# Patient Record
Sex: FEMALE | Race: WHITE | NOT HISPANIC OR LATINO | Employment: STUDENT | URBAN - METROPOLITAN AREA
[De-identification: names, ages, dates, MRNs, and addresses within clinical notes are randomized per-mention and may not be internally consistent; named-entity substitution may affect disease eponyms.]

---

## 2021-02-08 ENCOUNTER — NURSE TRIAGE (OUTPATIENT)
Dept: NURSING | Facility: CLINIC | Age: 21
End: 2021-02-08

## 2021-02-08 ENCOUNTER — E-VISIT (OUTPATIENT)
Dept: URGENT CARE | Facility: URGENT CARE | Age: 21
End: 2021-02-08

## 2021-02-08 ENCOUNTER — OFFICE VISIT (OUTPATIENT)
Dept: URGENT CARE | Facility: URGENT CARE | Age: 21
End: 2021-02-08
Payer: OTHER GOVERNMENT

## 2021-02-08 VITALS
DIASTOLIC BLOOD PRESSURE: 86 MMHG | HEIGHT: 62 IN | WEIGHT: 165 LBS | TEMPERATURE: 99.2 F | OXYGEN SATURATION: 100 % | HEART RATE: 99 BPM | BODY MASS INDEX: 30.36 KG/M2 | SYSTOLIC BLOOD PRESSURE: 122 MMHG | RESPIRATION RATE: 12 BRPM

## 2021-02-08 DIAGNOSIS — Z20.822 SUSPECTED COVID-19 VIRUS INFECTION: Primary | ICD-10-CM

## 2021-02-08 DIAGNOSIS — R09.81 CONGESTION OF PARANASAL SINUS: Primary | ICD-10-CM

## 2021-02-08 PROCEDURE — 99203 OFFICE O/P NEW LOW 30 MIN: CPT | Performed by: STUDENT IN AN ORGANIZED HEALTH CARE EDUCATION/TRAINING PROGRAM

## 2021-02-08 PROCEDURE — 99207 PR NO CHARGE LOS: CPT | Performed by: PHYSICIAN ASSISTANT

## 2021-02-08 RX ORDER — FLUTICASONE PROPIONATE 50 MCG
1 SPRAY, SUSPENSION (ML) NASAL DAILY
Qty: 16 G | Refills: 1 | Status: SHIPPED | OUTPATIENT
Start: 2021-02-08 | End: 2021-03-30

## 2021-02-08 RX ORDER — ECHINACEA PURPUREA EXTRACT 125 MG
TABLET ORAL
Qty: 104 ML | Refills: 0 | Status: SHIPPED | OUTPATIENT
Start: 2021-02-08 | End: 2021-03-30

## 2021-02-08 RX ORDER — ELASTIC BANDAGE 1"X2.2YD
1 BANDAGE TOPICAL 2 TIMES DAILY
Qty: 60 PACKET | Refills: 0 | Status: SHIPPED | OUTPATIENT
Start: 2021-02-08 | End: 2021-03-10

## 2021-02-08 ASSESSMENT — MIFFLIN-ST. JEOR: SCORE: 1461.69

## 2021-02-08 NOTE — PROGRESS NOTES
"  Assessment & Plan     #Sinus Congestion  #Eustachian tube Dysfunction   Start nasal clearance regime as outlined before. Low suspicion for superimposed bacterial sinus infection however will order Augmentin to be picked up in no improvement with regimes after 4 days or new fever develops.   Nasal clearance:    1.neti pot nasal clearage (twice a day)    2.Saline nasal spray (up to 4 times a day)   3.Flonase steroid spray (daily - mornings)   Augmentin BID X 7 days to be started if no improvement or worsening symptoms.     25 minutes spent on the date of the encounter doing chart review, patient visit and documentation     Follow up if no improvement in symptoms.     Chata Herron MD  [facility]    Danita Gorman is a 20 year old who presents to clinic today for the following health issues : right ear and sinus pressure/apin after covid infection     HPI     Vita is a pleasant 19 yo F w/ recent covid infection (diagnosed 10 days ago) c/b asnomia and fatigue, here with 4 days of worsening right ear pain and sinus pressure and worsening nasal congestion. She reports increased nasal drainage, slightly yellow mostly clear w/o purulence or blood. Pain in ear on right, feels full and hurts when she blows her nose. Denies new fevers. Endorses frontal headache over her eyes. Laying down seems to make the headache and right ear pain worse.        Review of Systems    ROS: 10 point ROS neg other than the symptoms noted above in the HPI.        Objective    /86   Pulse 99   Temp 99.2  F (37.3  C) (Oral)   Resp 12   Ht 1.575 m (5' 2\")   Wt 74.8 kg (165 lb)   LMP 01/25/2021   SpO2 100%   Breastfeeding No   BMI 30.18 kg/m    Body mass index is 30.18 kg/m .  Physical Exam   General Appearance: Non toxic, NAD  Eyes: no scleral injections, EOMI, PERRLA  HEENT: NCAT, nares patent, MMM, no oropharynx erythema, no oral lesions or petechiae, TM clear b/l  Neck: No JVD, ROM " intact, no nuchal rigidity, no ttp over SCMs b/l    Respiratory: CTAB, no work of breathing, no crackles, wheezes, ronchi  Cardiovascular: RRR,normal S1, S2, no murmur, extremities wwp, cap refill < 2 secs   GI: soft, no distention, no ttp, no organomegaly, normoactive bs, no peritoneal signs    Lymph/Hematologic: no cervical LAD, no generalized LAD, no bruises   Skin: no rashes, lesions   Musculoskeletal: no peripheral edema, no jnt ttp or effusions    Neurologic: A&OX3, CN 2-10 intact, no focal decifict, strength, sensation intact   Psychiatric: appropriate mood

## 2021-02-08 NOTE — PATIENT INSTRUCTIONS
Dear Vita Myranda Lucio,    Your symptoms show that you may have coronavirus (COVID-19). This illness can cause fever, cough and trouble breathing. Many people get a mild case and get better on their own. Some people can get very sick.    Will I be tested for COVID-19?  We would like to test you for Covid-19 virus. I have placed orders for this test.     To schedule: go to your [employer] home page and scroll down to the section that says  You have an appointment that needs to be scheduled  and click the large green button that says  Schedule Now  and follow the steps to find the next available openings.    If you are unable to complete these [employer] scheduling steps, please call [phone] to schedule your testing.     Return to work/school/ guidance:  Please let your workplace manager and staffing office know when your quarantine ends     We can t give you an exact date as it depends on the above. You can calculate this on your own or work with your manager/staffing office to calculate this. (For example if you were exposed on 10/4, you would have to quarantine for 14 full days. That would be through 10/18. You could return on 10/19.)      If you receive a positive COVID-19 test result, follow the guidance of the those who are giving you the results. Usually the return to work is 10 (or in some cases 20 days from symptom onset.) If you work at [facility] [employer], you must also be cleared by Employee Occupational Health and Safety to return to work.        If you receive a negative COVID-19 test result and did not have a high risk exposure to someone with a known positive COVID-19 test, you can return to work once you're free of fever for 24 hours without fever-reducing medication and your symptoms are improving or resolved.      If you receive a negative COVID-19 test and If you had a high risk exposure to someone who has tested positive for COVID-19 then you can return to work 14 days after your last  contact with the positive individual    Note: If you have ongoing exposure to the covid positive person, this quarantine period may be more than 14 days. (For example, if you are continued to be exposed to your child who tested positive and cannot isolate from them, then the quarantine of 7-14 days can't start until your child is no longer contagious. This is typically 10 days from onset of the child's symptoms. So the total duration may be 17-24 days in this case.)    Sign up for [employer].   We know it's scary to hear that you might have COVID-19. We want to track your symptoms to make sure you're okay over the next 2 weeks. Please look for an email from [employer]--this is a free, online program that we'll use to keep in touch. To sign up, follow the link in the email you will receive. Learn more at [email or web address]    How can I take care of myself?    Get lots of rest. Drink extra fluids (unless a doctor has told you not to)    Take Tylenol (acetaminophen) or ibuprofen for fever or pain. If you have liver or kidney problems, ask your family doctor if it's okay to take Tylenol o ibuprofen    If you have other health problems (like cancer, heart failure, an organ transplant or severe kidney disease): Call your specialty clinic if you don't feel better in the next 2 days.    Know when to call 911. Emergency warning signs include:  o Trouble breathing or shortness of breath  o Pain or pressure in the chest that doesn't go away  o Feeling confused like you haven't felt before, or not being able to wake up  o Bluish-colored lips or face    Where can I get more information?  [facility] - About COVID-19:   [email or web address]    CDC - What to Do If You're Sick:   www.cdc.gov/coronavirus/2019-ncov/about/steps-when-sick.html

## 2021-02-08 NOTE — TELEPHONE ENCOUNTER
"\"I tested positive for covid, today is day 10  for me. Now I also have ear pain, headache and sore throat. No fever or other sx.  Triaged, gave home care advice and advised MERT Overton RN [facility]    COVID 19 Nurse Triage Plan/Patient Instructions    Please be aware that novel coronavirus (COVID-19) may be circulating in the community. If you develop symptoms such as fever, cough, or SOB or if you have concerns about the presence of another infection including coronavirus (COVID-19), please contact your health care provider or visit [email or web address].     Disposition/Instructions    In-Person Visit with provider recommended. Reference Visit Selection Guide.    Thank you for taking steps to prevent the spread of this virus.  o Limit your contact with others.  o Wear a simple mask to cover your cough.  o Wash your hands well and often.    Resources    [facility]: About COVID-19: [email or web address]    CDC: What to Do If You're Sick: www.cdc.gov/coronavirus/2019-ncov/about/steps-when-sick.html    CDC: Ending Home Isolation: www.cdc.gov/coronavirus/2019-ncov/hcp/disposition-in-home-patients.html     CDC: Caring for Someone: www.cdc.gov/coronavirus/2019-ncov/if-you-are-sick/care-for-someone.html     [facility]: Interim Guidance for Hospital Discharge to Home: www.health.[facility].mn.us/diseases/coronavirus/hcp/hospdischarge.pdf    [facility] clinical trials (COVID-19 research studies): clinicalaffairs.[facility]/n-clinical-trials     Below are the COVID-19 hotlines at the [facility] of Health ([facility]). Interpreters are available.   o For health questions: Call 439-483-4589 or 1-366.567.8335 (7 a.m. to 7 p.m.)  o For questions about schools and childcare: Call 328-319-6303 or 1-325.651.3667 (7 a.m. to 7 p.m.)                      Additional Information    Negative: Fever > 104 F (40 C)    Negative: Patient sounds very sick or weak to the triager    Negative: [1] SEVERE " pain AND [2] not improved 2 hours after taking analgesic medication (e.g., ibuprofen or acetaminophen)    Negative: Walking is very unsteady    Negative: Sudden onset of ear pain after long - thin object was inserted into the ear canal (e.g., pencil, Q-tip)    Negative: Diabetes mellitus or weak immune system (e.g., HIV positive, cancer chemo, splenectomy, organ transplant, chronic steroids)    Negative: New blurred vision or vision changes    Negative: White, yellow, or green discharge    Negative: Bloody discharge or unexplained bleeding from ear canal    Earache  (Exceptions: brief ear pain of < 60 minutes duration, earache occurring during air travel    Protocols used: EARACHE-A-

## 2021-02-08 NOTE — PATIENT INSTRUCTIONS
neti pot nasal clearage (twice a day)   Saline nasal spray (up to 4 times a day)  flonase steroid spray (daily - mornings)     If no symptoms improve in 5 days OR new fever, worsening headaches or pain or purulent drainage, okay to start antibiotics. Augmentin is the antibiotic

## 2021-03-07 ENCOUNTER — HEALTH MAINTENANCE LETTER (OUTPATIENT)
Age: 21
End: 2021-03-07

## 2021-03-28 ENCOUNTER — NURSE TRIAGE (OUTPATIENT)
Dept: NURSING | Facility: CLINIC | Age: 21
End: 2021-03-28

## 2021-03-28 NOTE — TELEPHONE ENCOUNTER
"Patient complains of left wrist pain, believes she has a ganglion cyst.  Has had for about one year and has worsened in past couple of days.  Ibuprofen provides very little relief.  Connected to scheduling for office visit within next 3 days.    Reason for Disposition    [1] MODERATE pain (e.g., interferes with normal activities) AND [2] present > 3 days    Additional Information    Negative: [1] Similar pain previously AND [2] it was from \"heart attack\"    Negative: [1] Similar pain previously AND [2] it was from \"angina\" AND [3] not relieved by nitroglycerin    Negative: Sounds like a life-threatening emergency to the triager    Negative: Followed a hand or wrist injury    Negative: Chest pain    Negative: Caused by an animal bite    Negative: Caused by a human bite    Negative: Wound looks infected    Negative: Finger pain is main symptom    Negative: Arm pain is main symptom    Negative: [1] Swollen joint AND [2] fever    Negative: [1] Red area or streak AND [2] fever    Negative: Patient sounds very sick or weak to the triager    Negative: [1] SEVERE pain (e.g., excruciating, unable to use hand at all) AND [2] not improved after 2 hours of pain medicine    Negative: [1] Looks infected (spreading redness, pus) AND [2] large red area (> 2 in. or 5 cm)    Negative: Weakness (i.e., loss of strength) of new onset in hand or fingers  (Exceptions: not truly weak, hand feels weak because of pain; weakness present > 2 weeks)    Negative: Numbness (i.e., loss of sensation) in hand or fingers (Exception: just tingling; numbness present > 2 weeks)    Negative: Looks like a boil, infected sore, deep ulcer or other infected rash (spreading redness, pus)    Negative: [1] Localized rash is very painful AND [2] no fever    Protocols used: HAND AND WRIST PAIN-A-AH      "

## 2021-03-30 ENCOUNTER — OFFICE VISIT (OUTPATIENT)
Dept: FAMILY MEDICINE | Facility: CLINIC | Age: 21
End: 2021-03-30

## 2021-03-30 ENCOUNTER — ANCILLARY PROCEDURE (OUTPATIENT)
Dept: GENERAL RADIOLOGY | Facility: CLINIC | Age: 21
End: 2021-03-30
Attending: FAMILY MEDICINE

## 2021-03-30 VITALS
TEMPERATURE: 97.8 F | DIASTOLIC BLOOD PRESSURE: 86 MMHG | SYSTOLIC BLOOD PRESSURE: 137 MMHG | HEART RATE: 100 BPM | OXYGEN SATURATION: 95 % | RESPIRATION RATE: 18 BRPM | WEIGHT: 219 LBS | BODY MASS INDEX: 40.06 KG/M2

## 2021-03-30 DIAGNOSIS — M25.532 LEFT WRIST PAIN: Primary | ICD-10-CM

## 2021-03-30 PROCEDURE — 73110 X-RAY EXAM OF WRIST: CPT | Mod: LT | Performed by: RADIOLOGY

## 2021-03-30 PROCEDURE — 99213 OFFICE O/P EST LOW 20 MIN: CPT | Performed by: FAMILY MEDICINE

## 2021-03-30 NOTE — PROGRESS NOTES
"    Assessment & Plan     Left wrist pain  No obvious cyst palpable on exam. Slight bony protrusion over area of pain. Discuss follow-up and evaluation by ortho. Continue to use wrist brace to help with symptoms. DDx: Arthritis, calcified tendon, ganglion cyst, over use.  - XR Wrist Left G/E 3 Views  - Orthopedic & Spine  Referral        15 minutes spent on the date of the encounter doing chart review, history and exam, documentation and further activities per the note    DO SUSHIL [facility]    Danita Gorman is a 20 year old who presents for the following health issues:    HPI   Top of left hand/wrist, \"cyst\" there for several years. Pain the last few days. Sharp pain, radiates up to her elbow. Pain worse with movement. Wearing wrist brace which helps. Works as a nanny. Denies numbness, tingling, weakness.     Left wrist pain  Onset/Duration: x 2 days  Description   Patient complains of left wrist pain, believes she has a ganglion cyst.  Has had for about one year and has worsened in past couple of days.  Location: wrist - left  Joint Swelling: no  Redness: no  Pain: YES  Warmth: no  Intensity:  severe  Progression of Symptoms:  worsening  Accompanying signs and symptoms:   Fevers: no  Numbness/tingling/weakness: no  History  Trauma to the area: no  Recent illness:  no  Previous similar problem: YES  Previous evaluation:  no  Precipitating or alleviating factors:  Aggravating factors include: lifting  Therapies tried and outcome:  Ibuprofen doesn't help     Review of Systems   CONSTITUTIONAL: NEGATIVE for fever, chills, change in weight  INTEGUMENTARY/SKIN: NEGATIVE for worrisome rashes, moles or lesions  NEURO: NEGATIVE for weakness, dizziness or paresthesias      Objective    /86 (BP Location: Left arm, Patient Position: Sitting, Cuff Size: Adult Regular)   Pulse 100   Temp 97.8  F (36.6  C) (Tympanic)   Resp 18   Wt 99.3 kg (219 lb)   SpO2 95%   BMI " 40.06 kg/m    Body mass index is 40.06 kg/m .  Physical Exam   GENERAL: healthy, alert and no distress  RESP: lungs clear to auscultation - no rales, rhonchi or wheezes  MS: strength and sensation intact in left hand. Slight bony protrusion over dorsal aspect of mid-wrist, slightly tender to palpation. Pain with ROM of left wrist. No obvious swelling of left wrist. Pulses intact in leftt upper extremity.    SKIN: no suspicious lesions or rashes

## 2021-03-31 NOTE — TELEPHONE ENCOUNTER
DIAGNOSIS: Left wrist pain /Dr Farooq/ XR   APPOINTMENT DATE: 4.7.21   NOTES STATUS DETAILS   OFFICE NOTE from referring provider Internal 3.30.21 Dr Rosas Farooq, [facility]   OFFICE NOTE from other specialist N/A    DISCHARGE SUMMARY from hospital N/A    DISCHARGE REPORT from the ER N/A    OPERATIVE REPORT N/A    EMG report N/A    MEDICATION LIST N/A    MRI N/A    DEXA (osteoporosis/bone health) N/A    CT SCAN N/A    XRAYS (IMAGES & REPORTS) Internal 3.30.21 L wrist

## 2021-04-07 ENCOUNTER — PRE VISIT (OUTPATIENT)
Dept: ORTHOPEDICS | Facility: CLINIC | Age: 21
End: 2021-04-07

## 2021-04-07 ENCOUNTER — OFFICE VISIT (OUTPATIENT)
Dept: ORTHOPEDICS | Facility: CLINIC | Age: 21
End: 2021-04-07

## 2021-04-07 VITALS — BODY MASS INDEX: 35.36 KG/M2 | WEIGHT: 220 LBS | HEIGHT: 66 IN

## 2021-04-07 DIAGNOSIS — G89.29 CHRONIC PAIN OF LEFT WRIST: Primary | ICD-10-CM

## 2021-04-07 DIAGNOSIS — M25.532 CHRONIC PAIN OF LEFT WRIST: Primary | ICD-10-CM

## 2021-04-07 PROCEDURE — 99203 OFFICE O/P NEW LOW 30 MIN: CPT | Performed by: FAMILY MEDICINE

## 2021-04-07 ASSESSMENT — MIFFLIN-ST. JEOR: SCORE: 1774.66

## 2021-04-07 NOTE — PROGRESS NOTES
CHIEF COMPLAINT:  Pain of the Left Wrist       HISTORY OF PRESENT ILLNESS  Ms. Valdes is a pleasant 20 year old year old female who presents to clinic today with left wrist and hand pain.  Vita explains that she thought she had a ganglion cyst, it swells up and then goes out.  She was seen by her primary care office for potential ganglion cyst, she was given a wrist brace that did help for some time, she wore this intermittently.    Onset: gradual  Location: left wrist  Quality:  aching and sharp  Duration: 2 years   Severity: 8/10 at worst  Timing:intermittent episodes  Modifying factors:  resting and non-use makes it better, movement and use makes it worse  Associated signs & symptoms: pain and swelling  Previous similar pain: Yes  Treatments to date: Wore a brace it helped, Ice helped a little.    Additional history: as documented    Review of Systems:    Have you recently had a a fever, chills, weight loss? No    Do you have any vision problems? No    Do you have any chest pain or edema? No    Do you have any shortness of breath or wheezing?  No    Do you have stomach problems? No    Do you have any numbness or focal weakness? No    Do you have diabetes? No    Do you have problems with bleeding or clotting? No    Do you have an rashes or other skin lesions? No    MEDICAL HISTORY  There is no problem list on file for this patient.      No current outpatient medications on file.       No Known Allergies    No family history on file.    Additional medical/Social/Surgical histories reviewed in EPIC and updated as appropriate.       PHYSICAL EXAM  General  - normal appearance, in no obvious distress  HEENT  - conjunctivae not injected, moist mucous membranes  CV  - normal radial pulse  Pulm  - normal respiratory pattern, non-labored  Musculoskeletal - left wrist  - inspection: Visible swelling at dorsal hand, essentially  - palpation: Area of swelling is tender, not mobile  - ROM: FROM, although painful endrange  flexion and ulnar deviation  - strength: 5/5  strength, 5/5 flexion, extension, pronation, supination, adduction, abduction  Neuro  - no sensory or motor deficit, grossly normal coordination, normal muscle tone  Skin  - no ecchymosis, erythema, warmth, or induration, no obvious rash  Psych  - interactive, appropriate, normal mood and affect        ASSESSMENT & PLAN  Ms. Valdes is a 20 year old year old female who presents to clinic today with chronic dorsal left wrist pain.    I reviewed her x-ray in the room with her, this shows a bony prominence at the dorsal aspect of her carpometacarpal joints, centrally.    Ultimately it is difficult to tell if this is a bony prominence or a true ganglion cyst.  Given the effectiveness of the brace previously I do think it is worth doing this diligently, I do recommend that she wear her brace for about 4 weeks.  We gave her a brace in clinic today.    If she does not find any improvement whatsoever with the brace at the end of the duration I would consider advanced imaging.    It was a pleasure seeing Vita today.    Jordi Delgado, DO CAQSM

## 2021-04-07 NOTE — LETTER
4/7/2021         RE: Vita Valdes  [address]  [place] [address]        Dear Colleague,    Thank you for referring your patient, Vita Valdes, to the [facility] [place]. Please see a copy of my visit note below.    CHIEF COMPLAINT:  Pain of the Left Wrist       HISTORY OF PRESENT ILLNESS  Ms. Valdes is a pleasant 20 year old year old female who presents to clinic today with left wrist and hand pain.  Vita explains that she thought she had a ganglion cyst, it swells up and then goes out.  She was seen by her primary care office for potential ganglion cyst, she was given a wrist brace that did help for some time, she wore this intermittently.    Onset: gradual  Location: left wrist  Quality:  aching and sharp  Duration: 2 years   Severity: 8/10 at worst  Timing:intermittent episodes  Modifying factors:  resting and non-use makes it better, movement and use makes it worse  Associated signs & symptoms: pain and swelling  Previous similar pain: Yes  Treatments to date: Wore a brace it helped, Ice helped a little.    Additional history: as documented    Review of Systems:    Have you recently had a a fever, chills, weight loss? No    Do you have any vision problems? No    Do you have any chest pain or edema? No    Do you have any shortness of breath or wheezing?  No    Do you have stomach problems? No    Do you have any numbness or focal weakness? No    Do you have diabetes? No    Do you have problems with bleeding or clotting? No    Do you have an rashes or other skin lesions? No    MEDICAL HISTORY  There is no problem list on file for this patient.      No current outpatient medications on file.       No Known Allergies    No family history on file.    Additional medical/Social/Surgical histories reviewed in [facility] and updated as appropriate.       PHYSICAL EXAM  General  - normal appearance, in no obvious distress  HEENT  - conjunctivae not  injected, moist mucous membranes  CV  - normal radial pulse  Pulm  - normal respiratory pattern, non-labored  Musculoskeletal - left wrist  - inspection: Visible swelling at dorsal hand, essentially  - palpation: Area of swelling is tender, not mobile  - ROM: FROM, although painful endrange flexion and ulnar deviation  - strength: 5/5  strength, 5/5 flexion, extension, pronation, supination, adduction, abduction  Neuro  - no sensory or motor deficit, grossly normal coordination, normal muscle tone  Skin  - no ecchymosis, erythema, warmth, or induration, no obvious rash  Psych  - interactive, appropriate, normal mood and affect        ASSESSMENT & PLAN  Ms. Valdes is a 20 year old year old female who presents to clinic today with chronic dorsal left wrist pain.    I reviewed her x-ray in the room with her, this shows a bony prominence at the dorsal aspect of her carpometacarpal joints, centrally.    Ultimately it is difficult to tell if this is a bony prominence or a true ganglion cyst.  Given the effectiveness of the brace previously I do think it is worth doing this diligently, I do recommend that she wear her brace for about 4 weeks.  We gave her a brace in clinic today.    If she does not find any improvement whatsoever with the brace at the end of the duration I would consider advanced imaging.    It was a pleasure seeing Vita today.    Jordi Delgado, DO PATELM

## 2021-09-29 ENCOUNTER — OFFICE VISIT (OUTPATIENT)
Dept: URGENT CARE | Facility: URGENT CARE | Age: 21
End: 2021-09-29

## 2021-09-29 VITALS
WEIGHT: 190 LBS | HEIGHT: 62 IN | OXYGEN SATURATION: 97 % | HEART RATE: 109 BPM | TEMPERATURE: 98.7 F | DIASTOLIC BLOOD PRESSURE: 85 MMHG | SYSTOLIC BLOOD PRESSURE: 130 MMHG | RESPIRATION RATE: 18 BRPM | BODY MASS INDEX: 34.96 KG/M2

## 2021-09-29 DIAGNOSIS — R07.0 THROAT PAIN: Primary | ICD-10-CM

## 2021-09-29 LAB
DEPRECATED S PYO AG THROAT QL EIA: NEGATIVE
GROUP A STREP BY PCR: NOT DETECTED

## 2021-09-29 PROCEDURE — 87651 STREP A DNA AMP PROBE: CPT | Performed by: NURSE PRACTITIONER

## 2021-09-29 PROCEDURE — 99213 OFFICE O/P EST LOW 20 MIN: CPT | Performed by: NURSE PRACTITIONER

## 2021-09-29 ASSESSMENT — MIFFLIN-ST. JEOR: SCORE: 1570.08

## 2021-09-29 NOTE — PROGRESS NOTES
"Chief Complaint   Patient presents with     Urgent Care     Pharyngitis     Since friday      SUBJECTIVE:  Vita Valdes is a 21 year old female presenting with sore throat and white patches on her right tonsil for 6 days. She is covid vaccinated and had covid last january.    No past medical history on file.  No current outpatient medications on file prior to visit.  No current facility-administered medications on file prior to visit.    Social History     Tobacco Use     Smoking status: Never Smoker     Smokeless tobacco: Never Used   Substance Use Topics     Alcohol use: Not on file     No Known Allergies    Review of Systems   Constitutional: Negative for chills, diaphoresis and fever.   HENT: Positive for sore throat and voice change. Negative for congestion and ear pain.    Respiratory: Negative for cough.    Gastrointestinal: Negative for nausea.   Musculoskeletal: Negative for myalgias.   Skin: Negative for rash.   Neurological: Negative for headaches.   Hematological: Positive for adenopathy.   Psychiatric/Behavioral: Negative for sleep disturbance.     OBJECTIVE:   /85   Pulse 109   Temp 98.7  F (37.1  C) (Oral)   Resp 18   Ht 1.575 m (5' 2\")   Wt 86.2 kg (190 lb)   SpO2 97%   BMI 34.75 kg/m       Physical Exam  Constitutional:       General: She is not in acute distress.     Appearance: She is well-developed. She is not ill-appearing, toxic-appearing or diaphoretic.   HENT:      Head: Normocephalic and atraumatic.      Mouth/Throat:      Pharynx: Oropharyngeal exudate and posterior oropharyngeal erythema present.   Eyes:      Conjunctiva/sclera: Conjunctivae normal.      Pupils: Pupils are equal, round, and reactive to light.   Cardiovascular:      Rate and Rhythm: Normal rate.      Pulses: Normal pulses.   Pulmonary:      Effort: Pulmonary effort is normal. No respiratory distress.      Breath sounds: Normal breath sounds. No stridor. No wheezing, rhonchi or rales.   Chest:      Chest " wall: No tenderness.   Musculoskeletal:         General: Normal range of motion.      Cervical back: Normal range of motion and neck supple.   Lymphadenopathy:      Cervical: Cervical adenopathy present.   Skin:     General: Skin is warm.      Capillary Refill: Capillary refill takes less than 2 seconds.      Findings: No rash.   Neurological:      General: No focal deficit present.      Mental Status: She is alert and oriented to person, place, and time.   Psychiatric:         Mood and Affect: Mood normal.         Behavior: Behavior normal.       Results for orders placed or performed in visit on 09/29/21   Streptococcus A Rapid Screen w/Reflex to PCR - Clinic Collect     Status: Normal    Specimen: Throat; Swab   Result Value Ref Range    Group A Strep antigen Negative Negative   Group A Streptococcus PCR Throat Swab     Status: Normal    Specimen: Throat; Swab   Result Value Ref Range    Group A strep by PCR Not Detected Not Detected    Narrative    The Xpert Xpress Strep A test, performed on the [employer], is a rapid, qualitative in vitro diagnostic test for the detection of Streptococcus pyogenes (Group A ß-hemolytic Streptococcus, Strep A) in throat swab specimens from patients with signs and symptoms of pharyngitis. The Xpert Xpress Strep A test can be used as an aid in the diagnosis of Group A Streptococcal pharyngitis. The assay is not intended to monitor treatment for Group A Streptococcus infections. The Xpert Xpress Strep A test utilizes an automated real-time polymerase chain reaction (PCR) to detect Streptococcus pyogenes DNA.     ASSESSMENT:    ICD-10-CM    1. Throat pain  R07.0 Streptococcus A Rapid Screen w/Reflex to PCR - Clinic Collect     Group A Streptococcus PCR Throat Swab     PLAN:   Patient Instructions   Rapid strep test today is negative.   Your throat culture is pending. We call if positive.  Drink plenty of fluids and rest.  May use salt water gargles- about 8 oz warm  water with about 1 teaspoon salt  Sucrets and Cepacol spray are over the counter medications that numb the throat.  Over the counter pain relievers such as tylenol or ibuprofen may be used as needed.   Mucinex is product known to help loosen congestion and thin mucus (generic is guaifenesin)   Delsym 12 hour over the counter works well for cough.  Honey has been shown to be helpful in cough management and is soothing to a sore throat. May add to lemon tea.  Please follow up with primary care provider if not improving, worsening or new symptoms.    Follow up with primary care provider with any problems, questions or concerns or if symptoms worsen or fail to improve. Patient agreed to plan and verbalized understanding.    KEMAL Camacho-[facility]

## 2021-09-30 ASSESSMENT — ENCOUNTER SYMPTOMS
HEADACHES: 0
CHILLS: 0
SLEEP DISTURBANCE: 0
SORE THROAT: 1
COUGH: 0
DIAPHORESIS: 0
VOICE CHANGE: 1
ADENOPATHY: 1
NAUSEA: 0
MYALGIAS: 0
FEVER: 0

## 2021-09-30 NOTE — PATIENT INSTRUCTIONS
Rapid strep test today is negative.   Your throat culture is pending. We call if positive.  Drink plenty of fluids and rest.  May use salt water gargles- about 8 oz warm water with about 1 teaspoon salt  Sucrets and Cepacol spray are over the counter medications that numb the throat.  Over the counter pain relievers such as tylenol or ibuprofen may be used as needed.   Mucinex is product known to help loosen congestion and thin mucus (generic is guaifenesin)   Delsym 12 hour over the counter works well for cough.  Honey has been shown to be helpful in cough management and is soothing to a sore throat. May add to lemon tea.  Please follow up with primary care provider if not improving, worsening or new symptoms.

## 2021-10-11 ENCOUNTER — HEALTH MAINTENANCE LETTER (OUTPATIENT)
Age: 21
End: 2021-10-11

## 2022-02-01 ENCOUNTER — OFFICE VISIT (OUTPATIENT)
Dept: FAMILY MEDICINE | Facility: CLINIC | Age: 22
End: 2022-02-01
Payer: COMMERCIAL

## 2022-02-01 VITALS
HEART RATE: 88 BPM | DIASTOLIC BLOOD PRESSURE: 76 MMHG | BODY MASS INDEX: 42.34 KG/M2 | WEIGHT: 231.5 LBS | SYSTOLIC BLOOD PRESSURE: 132 MMHG

## 2022-02-01 DIAGNOSIS — E66.01 MORBID OBESITY (H): ICD-10-CM

## 2022-02-01 DIAGNOSIS — F90.2 ATTENTION DEFICIT HYPERACTIVITY DISORDER (ADHD), COMBINED TYPE: Primary | ICD-10-CM

## 2022-02-01 LAB
ALBUMIN SERPL-MCNC: 3.9 G/DL (ref 3.5–5)
ALP SERPL-CCNC: 61 U/L (ref 45–120)
ALT SERPL W P-5'-P-CCNC: 20 U/L (ref 0–45)
ANION GAP SERPL CALCULATED.3IONS-SCNC: 12 MMOL/L (ref 5–18)
AST SERPL W P-5'-P-CCNC: 15 U/L (ref 0–40)
BILIRUB SERPL-MCNC: 0.7 MG/DL (ref 0–1)
BUN SERPL-MCNC: 13 MG/DL (ref 8–22)
CALCIUM SERPL-MCNC: 9.7 MG/DL (ref 8.5–10.5)
CHLORIDE BLD-SCNC: 104 MMOL/L (ref 98–107)
CO2 SERPL-SCNC: 22 MMOL/L (ref 22–31)
CREAT SERPL-MCNC: 0.69 MG/DL (ref 0.6–1.1)
ERYTHROCYTE [DISTWIDTH] IN BLOOD BY AUTOMATED COUNT: 11.7 % (ref 10–15)
GFR SERPL CREATININE-BSD FRML MDRD: >90 ML/MIN/1.73M2
GLUCOSE BLD-MCNC: 100 MG/DL (ref 70–125)
HBA1C MFR BLD: 4.7 % (ref 0–5.6)
HCT VFR BLD AUTO: 42.8 % (ref 35–47)
HGB BLD-MCNC: 14.1 G/DL (ref 11.7–15.7)
MAGNESIUM SERPL-MCNC: 1.8 MG/DL (ref 1.8–2.6)
MCH RBC QN AUTO: 28.7 PG (ref 26.5–33)
MCHC RBC AUTO-ENTMCNC: 32.9 G/DL (ref 31.5–36.5)
MCV RBC AUTO: 87 FL (ref 78–100)
PLATELET # BLD AUTO: 328 10E3/UL (ref 150–450)
POTASSIUM BLD-SCNC: 4.2 MMOL/L (ref 3.5–5)
PROT SERPL-MCNC: 7.5 G/DL (ref 6–8)
RBC # BLD AUTO: 4.91 10E6/UL (ref 3.8–5.2)
SODIUM SERPL-SCNC: 138 MMOL/L (ref 136–145)
T3FREE SERPL-MCNC: 2.7 PG/ML (ref 1.6–3.9)
T4 FREE SERPL-MCNC: 0.97 NG/DL (ref 0.7–1.8)
TSH SERPL DL<=0.005 MIU/L-ACNC: 1.05 UIU/ML (ref 0.3–5)
WBC # BLD AUTO: 8.7 10E3/UL (ref 4–11)

## 2022-02-01 PROCEDURE — 99214 OFFICE O/P EST MOD 30 MIN: CPT | Performed by: FAMILY MEDICINE

## 2022-02-01 PROCEDURE — 84481 FREE ASSAY (FT-3): CPT | Performed by: FAMILY MEDICINE

## 2022-02-01 PROCEDURE — 80053 COMPREHEN METABOLIC PANEL: CPT | Performed by: FAMILY MEDICINE

## 2022-02-01 PROCEDURE — 84439 ASSAY OF FREE THYROXINE: CPT | Performed by: FAMILY MEDICINE

## 2022-02-01 PROCEDURE — 36415 COLL VENOUS BLD VENIPUNCTURE: CPT | Performed by: FAMILY MEDICINE

## 2022-02-01 PROCEDURE — 83735 ASSAY OF MAGNESIUM: CPT | Performed by: FAMILY MEDICINE

## 2022-02-01 PROCEDURE — 82306 VITAMIN D 25 HYDROXY: CPT | Performed by: FAMILY MEDICINE

## 2022-02-01 PROCEDURE — 84443 ASSAY THYROID STIM HORMONE: CPT | Performed by: FAMILY MEDICINE

## 2022-02-01 PROCEDURE — 83036 HEMOGLOBIN GLYCOSYLATED A1C: CPT | Performed by: FAMILY MEDICINE

## 2022-02-01 PROCEDURE — 85027 COMPLETE CBC AUTOMATED: CPT | Performed by: FAMILY MEDICINE

## 2022-02-01 ASSESSMENT — ANXIETY QUESTIONNAIRES
5. BEING SO RESTLESS THAT IT IS HARD TO SIT STILL: NEARLY EVERY DAY
3. WORRYING TOO MUCH ABOUT DIFFERENT THINGS: NOT AT ALL
GAD7 TOTAL SCORE: 9
6. BECOMING EASILY ANNOYED OR IRRITABLE: SEVERAL DAYS
7. FEELING AFRAID AS IF SOMETHING AWFUL MIGHT HAPPEN: NOT AT ALL
4. TROUBLE RELAXING: NEARLY EVERY DAY
2. NOT BEING ABLE TO STOP OR CONTROL WORRYING: SEVERAL DAYS
1. FEELING NERVOUS, ANXIOUS, OR ON EDGE: SEVERAL DAYS
IF YOU CHECKED OFF ANY PROBLEMS ON THIS QUESTIONNAIRE, HOW DIFFICULT HAVE THESE PROBLEMS MADE IT FOR YOU TO DO YOUR WORK, TAKE CARE OF THINGS AT HOME, OR GET ALONG WITH OTHER PEOPLE: VERY DIFFICULT

## 2022-02-01 ASSESSMENT — PATIENT HEALTH QUESTIONNAIRE - PHQ9: SUM OF ALL RESPONSES TO PHQ QUESTIONS 1-9: 7

## 2022-02-01 NOTE — PROGRESS NOTES
"ASSESSMENT/PLAN:  Vita was seen today for add (mental health).    Diagnoses and all orders for this visit:    Axis I: ADHD.  Anxiety.  Axis II: none   Axis III:  none  Axis IV: No psychosocial or environmental disorders  Axis V: Impaired academic performances     PLAN:  Likely has ADHD and anxiety  We will start the work-up including labs.  Await lab results  PHQ9=7, GAD7=9  And like her to come back in the next 1 to 2 weeks to talk about the results and management  -     T4 free; Future  -     T3 Free; Future  -     TSH; Future  -     Vitamin D Deficiency; Future  -     Magnesium; Future  -     Comprehensive metabolic panel; Future  -     CBC with platelets; Future  -     Hemoglobin A1c; Future  -     T4 free  -     T3 Free  -     TSH  -     Vitamin D Deficiency  -     Magnesium  -     Comprehensive metabolic panel  -     CBC with platelets  -     Hemoglobin A1c    Morbid obesity (H)  Counseled healthy lifestyle modifications    SUBJECTIVE:    Vita Valdes is a 21 year old female who came in today     Pleasant 21-year-old female who comes in today with concerns regarding ADHD  Stated that she has had concerns regarding academic performance and ADHD for quite a long time now.  She attended home school from fifth grade through high school.  She started in person school in college in [place].  She has 2 more years left  There is during this time that she started thinking more about ADHD symptoms  Because of Covid she is doing online schooling and it has been challenging for her  Going up she had a GPA of 4.0 with home school and now around 3.5 but \"barely making it\"  States that she finds it hard to perform multiple steps and tasks  She has a hard time \"doing stuff on time\"  She neglects personal care such as shower needs to remind herself  She has reminders of to change her cat litter box  She is very forgetful  She is easily distracted  Lack of motivation  She has a history of anxiety and is currently seeing " a therapist.  She has seen the therapist twice  She has never been on medication for ADHD, anxiety, or any other mental health conditions     trouble wrapping up the final details of a project once the challenging parts have been done: very often  difficulty getting things in order when you have to do a test that requires organization:   very often   problems with remembering appointments or obligations:  often  When you have a test that requires a lot of thought, how often to avoid or delay getting started:   very often  How often do fidget or squirm with your hands or feet when you had to sit down for long time:   very often  How often do you feel overly active and compelled to do things like you were driven by a motor:  rarely  How difficult have the above items made it for you to do you work, take care of things at home, or get along with other people:  very often    The symptoms are present in school, at home, at work    Nearly every day feeling restless that is hard to sit still  Nearly every day trouble relaxing  Several days feeling nervous, anxious, on edge.  Several days feeling like she cannot control her worrying  Several days feeling easily annoyed and irritable  GAD7=9    Nearly every day difficulty with concentration  Several days feeling lack of interest and pleasure in doing things.  Several days feeling tired or having very little energy  No concerns regarding feeling down, depressed, hopeless  PHQ9=7    PMH: none  PSH: none  Meds: none  Personal/social: Never been a smoker.  Occasional alcohol.  No illicit drug use.  About 1/2 cup of coffee daily.  FMH: Anxiety, ADHD, hypertension, possible thyroid    ROS:  No concerns regarding sleep, depression  She has mild anxiety especially in social setting  Normal menstrual cycle with medium flow  No constipation  Good level of energy    Review of Systems (except those mentioned above)  Constitutional: Negative.   HENT: Negative.   Eyes: Negative.    Respiratory: Negative.   Cardiovascular: Negative.   Gastrointestinal: Negative.   Endocrine: Negative.   Genitourinary: Negative.   Musculoskeletal: Negative.   Skin: Negative.   Allergic/Immunologic: Negative.   Neurological: Negative.   Hematological: Negative.   Psychiatric/Behavioral: Negative.     Patient Active Problem List    Diagnosis Date Noted     Morbid obesity (H) 02/01/2022     Priority: Medium     No Known Allergies  No current outpatient medications on file.     No past medical history on file.  No past surgical history on file.  Social History     Socioeconomic History     Marital status: Single     Spouse name: None     Number of children: None     Years of education: None     Highest education level: None   Occupational History     None   Tobacco Use     Smoking status: Never Smoker     Smokeless tobacco: Never Used   Substance and Sexual Activity     Alcohol use: None     Drug use: None     Sexual activity: None   Other Topics Concern     None   Social History Narrative     None     Social Determinants of Health     Financial Resource Strain: Not on file   Food Insecurity: Not on file   Transportation Needs: Not on file   Physical Activity: Not on file   Stress: Not on file   Social Connections: Not on file   Intimate Partner Violence: Not on file   Housing Stability: Not on file     No family history on file.      OBJECTIVE:    Vitals:    02/01/22 1237   BP: 132/76   BP Location: Left arm   Patient Position: Sitting   Cuff Size: Adult Large   Pulse: 88   Weight: 105 kg (231 lb 8 oz)     Body mass index is 42.34 kg/m .    Physical Exam:  Constitutional: Patient is oriented to person, place, and time. Patient appears well-developed and well-nourished. No distress.   Head: Normocephalic and atraumatic.   Right Ear: External ear normal.   Left Ear: External ear normal.   Eyes: Conjunctivae and EOM are normal. Right eye exhibits no discharge. Left eye exhibits no discharge. No scleral icterus.    Neurological: Patient is alert and oriented to person, place, and time.  Skin: No rash noted. Patient is not diaphoretic. No erythema. No pallor.    The patient has good eye contact.  No psychomotor retardation or stereotypical behaviors.  Speech was regular rate, regular rhythm, adequate responses.  Mood was stable and affect was congruent mood.  No suicidal or homicidal intent.  No hallucination.     Results for orders placed or performed in visit on 02/01/22   CBC with platelets     Status: Normal   Result Value Ref Range    WBC Count 8.7 4.0 - 11.0 10e3/uL    RBC Count 4.91 3.80 - 5.20 10e6/uL    Hemoglobin 14.1 11.7 - 15.7 g/dL    Hematocrit 42.8 35.0 - 47.0 %    MCV 87 78 - 100 fL    MCH 28.7 26.5 - 33.0 pg    MCHC 32.9 31.5 - 36.5 g/dL    RDW 11.7 10.0 - 15.0 %    Platelet Count 328 150 - 450 10e3/uL   Hemoglobin A1c     Status: Normal   Result Value Ref Range    Hemoglobin A1C 4.7 0.0 - 5.6 %

## 2022-02-02 LAB — DEPRECATED CALCIDIOL+CALCIFEROL SERPL-MC: 18 UG/L (ref 30–80)

## 2022-02-08 ENCOUNTER — OFFICE VISIT (OUTPATIENT)
Dept: FAMILY MEDICINE | Facility: CLINIC | Age: 22
End: 2022-02-08
Payer: COMMERCIAL

## 2022-02-08 VITALS
HEART RATE: 100 BPM | HEIGHT: 63 IN | BODY MASS INDEX: 40.32 KG/M2 | DIASTOLIC BLOOD PRESSURE: 76 MMHG | WEIGHT: 227.56 LBS | OXYGEN SATURATION: 98 % | SYSTOLIC BLOOD PRESSURE: 104 MMHG

## 2022-02-08 DIAGNOSIS — F90.2 ATTENTION DEFICIT HYPERACTIVITY DISORDER (ADHD), COMBINED TYPE: Primary | ICD-10-CM

## 2022-02-08 PROCEDURE — 99214 OFFICE O/P EST MOD 30 MIN: CPT | Performed by: FAMILY MEDICINE

## 2022-02-08 RX ORDER — DEXTROAMPHETAMINE SACCHARATE, AMPHETAMINE ASPARTATE MONOHYDRATE, DEXTROAMPHETAMINE SULFATE AND AMPHETAMINE SULFATE 2.5; 2.5; 2.5; 2.5 MG/1; MG/1; MG/1; MG/1
10 CAPSULE, EXTENDED RELEASE ORAL DAILY
Qty: 30 CAPSULE | Refills: 0 | Status: SHIPPED | OUTPATIENT
Start: 2022-02-08 | End: 2022-03-08

## 2022-02-08 ASSESSMENT — MIFFLIN-ST. JEOR: SCORE: 1756.35

## 2022-02-08 NOTE — PROGRESS NOTES
ASSESSMENT/PLAN:  Vita was seen today for results.    Axis I: ADHD.  Anxiety.  Axis II:  none  Axis III:  Vitamin D deficiency  Axis IV: No psychosocial or environmental disorders  Axis V: Impaired academic performances      PLAN:  Start Adderall.  Discussed side effects and safety profile  Continue with vitamin D  Continue with psychotherapy  Work on self-care  Motivational interviewing was utilized today.  Modified cognitive behavioral therapy was performed with counseling on internal locus of control.  Discussed importance of self care, sleep, nutrition, hydration, exercise, and mindfulness  Follow-up in 1 month  -     amphetamine-dextroamphetamine (ADDERALL XR) 10 MG 24 hr capsule; Take 1 capsule (10 mg) by mouth daily    SUBJECTIVE:    Vita Valdes is a 21 year old female who came in today     Pleasant 21-year-old female comes in today for follow-up.  She was seen 2 weeks ago for ADHD evaluation.  Laboratory tests showed a vitamin D deficiency and she is currently taking daily D3.  She has felt more energy on the vitamin D3    The assessment showed that she has ADHD   trouble wrapping up the final details of a project once the challenging parts have been done: very often  difficulty getting things in order when you have to do a test that requires organization:   very often   problems with remembering appointments or obligations:  often  When you have a test that requires a lot of thought, how often to avoid or delay getting started:   very often  How often do fidget or squirm with your hands or feet when you had to sit down for long time:   very often  How often do you feel overly active and compelled to do things like you were driven by a motor:  rarely  How difficult have the above items made it for you to do you work, take care of things at home, or get along with other people:  very often   The symptoms are present in school, at home, at work    She also has anxiety and associated lack of interest and  pleasure in doing things without depression.  PHQ-9 score of 7 and ROLAND-7 score of 9    Review of Systems (except those mentioned above)  Constitutional: Negative.   HENT: Negative.   Eyes: Negative.   Respiratory: Negative.   Cardiovascular: Negative.   Gastrointestinal: Negative.   Endocrine: Negative.   Genitourinary: Negative.   Musculoskeletal: Negative.   Skin: Negative.   Allergic/Immunologic: Negative.   Neurological: Negative.   Hematological: Negative.   Psychiatric/Behavioral: Negative.     Patient Active Problem List    Diagnosis Date Noted     Attention deficit hyperactivity disorder (ADHD), combined type 02/08/2022     Priority: Medium     Morbid obesity (H) 02/01/2022     Priority: Medium     No Known Allergies  Current Outpatient Medications   Medication Sig Dispense Refill     amphetamine-dextroamphetamine (ADDERALL XR) 10 MG 24 hr capsule Take 1 capsule (10 mg) by mouth daily 30 capsule 0     No past medical history on file.  No past surgical history on file.  Social History     Socioeconomic History     Marital status: Single     Spouse name: None     Number of children: None     Years of education: None     Highest education level: None   Occupational History     None   Tobacco Use     Smoking status: Never Smoker     Smokeless tobacco: Never Used   Substance and Sexual Activity     Alcohol use: None     Drug use: None     Sexual activity: None   Other Topics Concern     None   Social History Narrative     None     Social Determinants of Health     Financial Resource Strain: Not on file   Food Insecurity: Not on file   Transportation Needs: Not on file   Physical Activity: Not on file   Stress: Not on file   Social Connections: Not on file   Intimate Partner Violence: Not on file   Housing Stability: Not on file     No family history on file.      OBJECTIVE:    Vitals:    02/08/22 1043 02/08/22 1059   BP: 110/80 104/76   BP Location: Left arm    Patient Position: Sitting    Cuff Size: Adult Large   "  Pulse: 100    SpO2: 98%    Weight: 103.2 kg (227 lb 9 oz)    Height: 1.6 m (5' 3\")      Body mass index is 40.31 kg/m .    Physical Exam:  Constitutional: Patient is oriented to person, place, and time. Patient appears well-developed and well-nourished. No distress.   Head: Normocephalic and atraumatic.   Right Ear: External ear normal.   Left Ear: External ear normal.   Eyes: Conjunctivae and EOM are normal. Right eye exhibits no discharge. Left eye exhibits no discharge. No scleral icterus.   Neurological: Patient is alert and oriented to person, place, and time.  Skin: No rash noted. Patient is not diaphoretic. No erythema. No pallor.    The patient has good eye contact.  No psychomotor retardation or stereotypical behaviors.  Speech was regular rate, regular rhythm, adequate responses.  Mood was stable and affect was congruent mood.  No suicidal or homicidal intent.  No hallucination.   "

## 2022-03-08 ENCOUNTER — NURSE TRIAGE (OUTPATIENT)
Dept: NURSING | Facility: CLINIC | Age: 22
End: 2022-03-08

## 2022-03-08 ENCOUNTER — OFFICE VISIT (OUTPATIENT)
Dept: FAMILY MEDICINE | Facility: CLINIC | Age: 22
End: 2022-03-08
Payer: COMMERCIAL

## 2022-03-08 VITALS
HEART RATE: 76 BPM | SYSTOLIC BLOOD PRESSURE: 112 MMHG | BODY MASS INDEX: 39.93 KG/M2 | WEIGHT: 225.4 LBS | DIASTOLIC BLOOD PRESSURE: 84 MMHG

## 2022-03-08 DIAGNOSIS — M79.622 AXILLARY PAIN, LEFT: ICD-10-CM

## 2022-03-08 DIAGNOSIS — F90.2 ATTENTION DEFICIT HYPERACTIVITY DISORDER (ADHD), COMBINED TYPE: Primary | ICD-10-CM

## 2022-03-08 PROCEDURE — 99214 OFFICE O/P EST MOD 30 MIN: CPT | Performed by: FAMILY MEDICINE

## 2022-03-08 RX ORDER — DEXTROAMPHETAMINE SACCHARATE, AMPHETAMINE ASPARTATE MONOHYDRATE, DEXTROAMPHETAMINE SULFATE AND AMPHETAMINE SULFATE 2.5; 2.5; 2.5; 2.5 MG/1; MG/1; MG/1; MG/1
10 CAPSULE, EXTENDED RELEASE ORAL DAILY
Qty: 30 CAPSULE | Refills: 0 | Status: CANCELLED | OUTPATIENT
Start: 2022-03-08

## 2022-03-08 RX ORDER — DEXTROAMPHETAMINE SACCHARATE, AMPHETAMINE ASPARTATE MONOHYDRATE, DEXTROAMPHETAMINE SULFATE AND AMPHETAMINE SULFATE 5; 5; 5; 5 MG/1; MG/1; MG/1; MG/1
20 CAPSULE, EXTENDED RELEASE ORAL DAILY
Qty: 30 CAPSULE | Refills: 0 | Status: SHIPPED | OUTPATIENT
Start: 2022-05-09 | End: 2022-03-09

## 2022-03-08 NOTE — TELEPHONE ENCOUNTER
Triage Call: Patient is calling stating her insurance will only cover the non generic version of  her Adderall. Patient attempted to call the pharmacy already but has not been able to reach anyone at the pharmacy. Patient is going to call her pharmacy again to see if she is able to get the non generic version. Nurse attempted to call the pharmacy and the line was disconnected every time.     PCP are you able to route a new RX stating the patient needs the Non generic form.     Patient was informed that a message will be routed to her PCP. Patient stated she is going to go into the pharmacy to see if she can speak with someone in person.    Michelle Yan, RN Nursing Advisor 3/8/2022 5:42 PM     Reason for Disposition    [1] Caller has NON-URGENT medication question about med that PCP prescribed AND [2] triager unable to answer question    Additional Information    Negative: Drug overdose and triager unable to answer question    Negative: Caller requesting information unrelated to medicine    Negative: Caller requesting a prescription for Strep throat and has a positive culture result    Negative: Rash while taking a medication or within 3 days of stopping it    Negative: Immunization reaction suspected    Negative: [1] Asthma and [2] having symptoms of asthma (cough, wheezing, etc.)    Negative: [1] Influenza symptoms AND [2] anti-viral med prescription request, such as Tamiflu    Negative: [1] Symptom of illness (e.g., headache, abdominal pain, earache, vomiting) AND [2] more than mild    Negative: MORE THAN A DOUBLE DOSE of a prescription or over-the-counter (OTC) drug    Negative: [1] DOUBLE DOSE (an extra dose or lesser amount) of over-the-counter (OTC) drug AND [2] any symptoms (e.g., dizziness, nausea, pain, sleepiness)    Negative: [1] DOUBLE DOSE (an extra dose or lesser amount) of prescription drug AND [2] any symptoms (e.g., dizziness, nausea, pain, sleepiness)    Negative: Took another person's prescription  "drug    Negative: [1] DOUBLE DOSE (an extra dose or lesser amount) of prescription drug AND [2] NO symptoms (Exception: a double dose of antibiotics)    Negative: Diabetes drug error or overdose (e.g., took wrong type of insulin or took extra dose)    Negative: [1] Request for URGENT new prescription or refill of \"essential\" medication (i.e., likelihood of harm to patient if not taken) AND [2] triager unable to fill per unit policy    Negative: [1] Prescription not at pharmacy AND [2] was prescribed by PCP recently    Negative: [1] Pharmacy calling with prescription questions AND [2] triager unable to answer question    Negative: [1] Caller has URGENT medication question about med that PCP or specialist prescribed AND [2] triager unable to answer question    Protocols used: MEDICATION QUESTION CALL-A-AH      "

## 2022-03-08 NOTE — PROGRESS NOTES
ASSESSMENT/PLAN:  Vita was seen today for recheck medication.    Diagnoses and all orders for this visit:    Attention deficit hyperactivity disorder (ADHD), combined type  -     amphetamine-dextroamphetamine (ADDERALL XR) 20 MG 24 hr capsule; Take 1 capsule (20 mg) by mouth daily  Increase dose to 20mg  Reviewed side effects  Follow-up I 3 months    Axillary pain, left  Likely due to excessive soft tissue  Monitor for now    SUBJECTIVE:    Vita Valdes is a 21 year old female who came in today     Here to follow-up  adderall was started a month ago  Thinks adderall has helped with being more productive, more attentive, and less distracted  It worked well for the first 2 wks but thinks the effects are wearing off    Has chr left side armpit pain.  Shooting pain of the left armpit associated with her menses.  Now over the last 2 to 3 weeks the pain has become much more constant.  The pain radiates to the lateral side of the chest and under the left breast . no injury.  No rash.  No breast mass or lump.  No family history of breast concerns.    Review of Systems (except those mentioned above)  Constitutional: Negative.   HENT: Negative.   Eyes: Negative.   Respiratory: Negative.   Cardiovascular: Negative.   Gastrointestinal: Negative.   Endocrine: Negative.   Genitourinary: Negative.   Musculoskeletal: Negative.   Skin: Negative.   Allergic/Immunologic: Negative.   Neurological: Negative.   Hematological: Negative.   Psychiatric/Behavioral: Negative.     Patient Active Problem List    Diagnosis Date Noted     Attention deficit hyperactivity disorder (ADHD), combined type 02/08/2022     Priority: Medium     Morbid obesity (H) 02/01/2022     Priority: Medium     No Known Allergies  Current Outpatient Medications   Medication Sig Dispense Refill     [START ON 5/9/2022] amphetamine-dextroamphetamine (ADDERALL XR) 20 MG 24 hr capsule Take 1 capsule (20 mg) by mouth daily 30 capsule 0     No past medical history on  file.  No past surgical history on file.  Social History     Socioeconomic History     Marital status: Single     Spouse name: None     Number of children: None     Years of education: None     Highest education level: None   Occupational History     None   Tobacco Use     Smoking status: Never Smoker     Smokeless tobacco: Never Used   Substance and Sexual Activity     Alcohol use: None     Drug use: None     Sexual activity: None   Other Topics Concern     None   Social History Narrative     None     Social Determinants of Health     Financial Resource Strain: Not on file   Food Insecurity: Not on file   Transportation Needs: Not on file   Physical Activity: Not on file   Stress: Not on file   Social Connections: Not on file   Intimate Partner Violence: Not on file   Housing Stability: Not on file     No family history on file.      OBJECTIVE:    Vitals:    03/08/22 1421   BP: 112/84   BP Location: Left arm   Patient Position: Sitting   Cuff Size: Adult Large   Pulse: 76   Weight: 102.2 kg (225 lb 6.4 oz)     Body mass index is 39.93 kg/m .    Physical Exam:  Constitutional: Patient was oriented to person, place, and time. Patient appeared well-developed and well-nourished. No distress.   Head: Normocephalic and atraumatic.   Right Ear: External ear normal.   Left Ear: External ear normal.   Eyes: Conjunctivae and EOM were normal. Right eye exhibited no discharge. Left eye exhibited no discharge. No scleral icterus.   Neurological: Patient was alert and oriented to person, place, and time.  Coordination normal.   Breast: normal. No axillary lymph node involvement. She has excessive tissue along the left axillary side with some tenderness to palpation  Skin: Skin was warm and dry. No rash noted. Patient was not diaphoretic. No erythema. No pallor.       No results found for any visits on 03/08/22.   Answers for HPI/ROS submitted by the patient on 3/5/2022  How many servings of fruits and vegetables do you eat  daily?: 2-3  On average, how many sweetened beverages do you drink each day (Examples: soda, juice, sweet tea, etc.  Do NOT count diet or artificially sweetened beverages)?: 0  How many minutes a day do you exercise enough to make your heart beat faster?: 10 to 19  How many days a week do you exercise enough to make your heart beat faster?: 5  How many days per week do you miss taking your medication?: 0  What is the reason for your visit today?: Follow up and breast/armpit pain  When did your symptoms begin?: 3-4 weeks ago  How would you describe these symptoms?: Mild  Are your symptoms:: Improving  Have you had these symptoms before?: No

## 2022-03-09 DIAGNOSIS — F90.2 ATTENTION DEFICIT HYPERACTIVITY DISORDER (ADHD), COMBINED TYPE: ICD-10-CM

## 2022-03-09 RX ORDER — DEXTROAMPHETAMINE SACCHARATE, AMPHETAMINE ASPARTATE MONOHYDRATE, DEXTROAMPHETAMINE SULFATE AND AMPHETAMINE SULFATE 5; 5; 5; 5 MG/1; MG/1; MG/1; MG/1
20 CAPSULE, EXTENDED RELEASE ORAL DAILY
Qty: 30 CAPSULE | Refills: 0 | Status: SHIPPED | OUTPATIENT
Start: 2022-03-09 | End: 2022-04-18

## 2022-03-09 NOTE — TELEPHONE ENCOUNTER
"Patient was at the doctor today.  She was prescribed Adderall.  It was written to start 5/9 and she is supposed to start it tomorrow 3/9.  Will route urgent message to provider to change that and resend to pharmacy.  Patient asks if they can send her a Echograph message when it is sent.    Nicki Rogers RN   03/08/22 9:16 PM  [facility] Nurse Advisor      Reason for Disposition    [1] Caller has NON-URGENT medication question about med that PCP prescribed AND [2] triager unable to answer question    Additional Information    Negative: Drug overdose and triager unable to answer question    Negative: Caller requesting information unrelated to medicine    Negative: Caller requesting a prescription for Strep throat and has a positive culture result    Negative: Rash while taking a medication or within 3 days of stopping it    Negative: Immunization reaction suspected    Negative: [1] Asthma and [2] having symptoms of asthma (cough, wheezing, etc.)    Negative: [1] Influenza symptoms AND [2] anti-viral med prescription request, such as Tamiflu    Negative: [1] Symptom of illness (e.g., headache, abdominal pain, earache, vomiting) AND [2] more than mild    Negative: MORE THAN A DOUBLE DOSE of a prescription or over-the-counter (OTC) drug    Negative: [1] DOUBLE DOSE (an extra dose or lesser amount) of over-the-counter (OTC) drug AND [2] any symptoms (e.g., dizziness, nausea, pain, sleepiness)    Negative: [1] DOUBLE DOSE (an extra dose or lesser amount) of prescription drug AND [2] any symptoms (e.g., dizziness, nausea, pain, sleepiness)    Negative: Took another person's prescription drug    Negative: [1] DOUBLE DOSE (an extra dose or lesser amount) of prescription drug AND [2] NO symptoms (Exception: a double dose of antibiotics)    Negative: Diabetes drug error or overdose (e.g., took wrong type of insulin or took extra dose)    Negative: [1] Request for URGENT new prescription or refill of \"essential\" " medication (i.e., likelihood of harm to patient if not taken) AND [2] triager unable to fill per unit policy    Negative: [1] Prescription not at pharmacy AND [2] was prescribed by PCP recently    Negative: [1] Pharmacy calling with prescription questions AND [2] triager unable to answer question    Negative: [1] Caller has URGENT medication question about med that PCP or specialist prescribed AND [2] triager unable to answer question    Protocols used: MEDICATION QUESTION CALL-A-AH

## 2022-03-27 ENCOUNTER — HEALTH MAINTENANCE LETTER (OUTPATIENT)
Age: 22
End: 2022-03-27

## 2022-04-04 ENCOUNTER — OFFICE VISIT (OUTPATIENT)
Dept: URGENT CARE | Facility: URGENT CARE | Age: 22
End: 2022-04-04
Payer: COMMERCIAL

## 2022-04-04 VITALS
OXYGEN SATURATION: 98 % | RESPIRATION RATE: 15 BRPM | DIASTOLIC BLOOD PRESSURE: 80 MMHG | TEMPERATURE: 98.4 F | SYSTOLIC BLOOD PRESSURE: 110 MMHG | HEART RATE: 79 BPM

## 2022-04-04 DIAGNOSIS — J02.9 SORE THROAT: Primary | ICD-10-CM

## 2022-04-04 LAB
DEPRECATED S PYO AG THROAT QL EIA: NEGATIVE
GROUP A STREP BY PCR: NOT DETECTED

## 2022-04-04 PROCEDURE — 99213 OFFICE O/P EST LOW 20 MIN: CPT | Performed by: PHYSICIAN ASSISTANT

## 2022-04-04 PROCEDURE — 87651 STREP A DNA AMP PROBE: CPT | Performed by: PHYSICIAN ASSISTANT

## 2022-04-04 ASSESSMENT — ENCOUNTER SYMPTOMS
HEADACHES: 0
FATIGUE: 1
NAUSEA: 0
SHORTNESS OF BREATH: 1
APPETITE CHANGE: 1
SORE THROAT: 1
DIARRHEA: 0
VOMITING: 0
COUGH: 0
FEVER: 1
RHINORRHEA: 1

## 2022-04-04 NOTE — PATIENT INSTRUCTIONS
Patient Education     Viral Upper Respiratory Illness (Adult)    You have a viral upper respiratory illness (URI), which is another term for the common cold. This illness is contagious during the first few days. It is spread through the air by coughing and sneezing. It may also be spread by direct contact (touching the sick person and then touching your own eyes, nose, or mouth). Frequent handwashing will decrease risk of spread. Most viral illnesses go away within 7 to 10 days with rest and simple home remedies. Sometimes the illness may last for several weeks. Antibiotics will not kill a virus, and they are generally not prescribed for this condition.  Home care    If symptoms are severe, rest at home for the first 2 to 3 days. When you resume activity, don't let yourself get too tired.    Don't smoke. If you need help stopping, talk with your healthcare provider.    Avoid being exposed to cigarette smoke (yours or others ).    You may use acetaminophen or ibuprofen to control pain and fever, unless another medicine was prescribed. If you have chronic liver or kidney disease, have ever had a stomach ulcer or gastrointestinal bleeding, or are taking blood-thinning medicines, talk with your healthcare provider before using these medicines. Aspirin should never be given to anyone under 18 years of age who is ill with a viral infection or fever. It may cause severe liver or brain damage.    Your appetite may be poor, so a light diet is fine. Stay well hydrated by drinking 6 to 8 glasses of fluids per day (water, soft drinks, juices, tea, or soup). Extra fluids will help loosen secretions in the nose and lungs.    Over-the-counter cold medicines will not shorten the length of time you re sick, but they may be helpful for the following symptoms: cough, sore throat, and nasal and sinus congestion. If you take prescription medicines, ask your healthcare provider or pharmacist which over-the-counter medicines are safe to  use. (Note: Don't use decongestants if you have high blood pressure.)  Follow-up care  Follow up with your healthcare provider, or as advised.  When to seek medical advice  Call your healthcare provider right away if any of these occur:    Cough with lots of colored sputum (mucus)    Severe headache; face, neck, or ear pain    Difficulty swallowing due to throat pain    Fever of 100.4 F (38 C) or higher, or as directed by your healthcare provider  Call 911  Call 911 if any of these occur:    Chest pain, shortness of breath, wheezing, or difficulty breathing    Coughing up blood    Very severe pain with swallowing, especially if it goes along with a muffled voice   [employer] last reviewed this educational content on 6/1/2018 2000-2021 [employer]. All rights reserved. This information is not intended as a substitute for professional medical care. Always follow your healthcare professional's instructions.           Patient Education     Self-Care for Sore Throats     Sore throats happen for many reasons, such as colds, allergies, cigarette smoke, air pollution, and infections caused by viruses or bacteria. In any case, your throat becomes red and sore. Your goal for self-care is to reduce your discomfort while giving your throat a chance to heal.  Moisten and soothe your throat  Tips include the following:    Try a sip of water first thing after waking up.    Keep your throat moist by drinking 6 or more glasses of clear liquids every day.    Run a cool-air humidifier in your room overnight.    Stay away from cigarette smoke.     Check the air quality index,if air pollution gives you a sore throat. On high pollution days, try to limit outdoor time.    Suck on throat lozenges, cough drops, hard candy, ice chips, or frozen fruit-juice bars. Use the sugar-free versions if your diet or medical condition requires them.  Gargle to ease irritation  Gargling every hour or 2 can ease irritation. Try gargling  with 1 of these solutions:    1/4 teaspoon of salt in 1/2 cup of warm water    An over-the-counter anesthetic gargle  Use medicine for more relief  Over-the-counter medicine can reduce sore throat symptoms. Ask your pharmacist if you have questions about which medicine to use. To prevent possible medicine interactions, let the pharmacist know what medicines you take. To decrease symptoms:    Ease pain with anesthetic sprays. Aspirin or an aspirin substitute also helps. Remember, never give aspirin to anyone 18 or younger. Don't take aspirin if you are already taking blood thinners.     For sore throats caused by allergies, try antihistamines to block the allergic reaction.  Unless a sore throat is caused by a bacterial infection, antibiotics won t help you.  Prevent future sore throats  Prevention tips include:    Stop smoking or reduce contact with secondhand smoke. Smoke irritates the tender throat lining.    Limit contact with pets and with allergy-causing substances, such as pollen and mold.    Wash your hands often when you re around someone with a sore throat or cold. This will keep viruses or bacteria from spreading.    Limit outdoor time when air pollution is bad.    Don t strain your vocal cords.  When to call your healthcare provider  Contact your healthcare provider if you have:    Fever of 100.4 F (38.0 C) or higher, or as directed by your healthcare provider    White spots on the throat    Great Trouble swallowing    A skin rash    Recent exposure to someone else with strep bacteria    Severe hoarseness and swollen glands in the neck or jaw  Call 911  Call 911 if any of the following occur:    Trouble breathing or catching your breath    Drooling and problems swallowing    Wheezing    Unable to talk    Feeling dizzy or faint    Feeling of doom  [employer] last reviewed this educational content on 9/1/2019 2000-2021 [employer]. All rights reserved. This information is not intended as a  substitute for professional medical care. Always follow your healthcare professional's instructions.

## 2022-05-17 ENCOUNTER — OFFICE VISIT (OUTPATIENT)
Dept: ORTHOPEDICS | Facility: CLINIC | Age: 22
End: 2022-05-17
Payer: COMMERCIAL

## 2022-05-17 VITALS — WEIGHT: 225 LBS | BODY MASS INDEX: 39.87 KG/M2 | HEIGHT: 63 IN

## 2022-05-17 DIAGNOSIS — M25.532 CHRONIC PAIN OF LEFT WRIST: Primary | ICD-10-CM

## 2022-05-17 DIAGNOSIS — G89.29 CHRONIC PAIN OF LEFT WRIST: Primary | ICD-10-CM

## 2022-05-17 PROCEDURE — 99214 OFFICE O/P EST MOD 30 MIN: CPT | Performed by: FAMILY MEDICINE

## 2022-05-17 NOTE — PROGRESS NOTES
"HISTORY OF PRESENT ILLNESS  Ms. Valdes is a pleasant 21 year old female following up with left wrist pain.  Vita last saw me about a year ago, her pain has waxed and waned since last seeing us.  She has continued to brace, intermittently.  This does help at times, but ultimately her pain is still present.  She points to the dorsal aspect of her wrist, this is worse whenever she is in end ranges of flexion or extension.  She denies any numbness or tingling.     PHYSICAL EXAM  Vitals:    05/17/22 1452   Weight: 102.1 kg (225 lb)   Height: 1.6 m (5' 3\")   General  - normal appearance, in no obvious distress  Musculoskeletal - left wrist  - inspection: mild central dorsal wrist swelling  - palpation: no bony or soft tissue tenderness  - ROM: Full range of motion, painful resisted passive and active endrange flexion and extension  - strength: 5/5  strength  Neuro  - no sensory or motor deficit, grossly normal coordination, normal muscle tone            ASSESSMENT & PLAN  Ms. Valdes is a 21 year old female following up with left wrist pain that is now chronic.    We revisited possible etiologies for her pain, her pain may be secondary to a ganglion cyst or a separate intra-articular issue.  Given her level of pain without improvement with conservative care I am ordering an MRI.  She can have this done at her earliest convenience.    We should follow-up after MRI to go over the results.    It was a pleasure seeing Vita.    Guillermo Delgado DO, CAQSM      This note was constructed using Dragon dictation software, please excuse any minor errors in spelling, grammar, or syntax.    "

## 2022-05-17 NOTE — LETTER
"  5/17/2022      RE: Vita Valdes  [address]  [place] [address]     Dear Colleague,    Thank you for referring your patient, Vita Valdes, to the [facility] [place]. Please see a copy of my visit note below.    HISTORY OF PRESENT ILLNESS  Ms. Valdes is a pleasant 21 year old female following up with left wrist pain.  Vita last saw me about a year ago, her pain has waxed and waned since last seeing us.  She has continued to brace, intermittently.  This does help at times, but ultimately her pain is still present.  She points to the dorsal aspect of her wrist, this is worse whenever she is in end ranges of flexion or extension.  She denies any numbness or tingling.     PHYSICAL EXAM  Vitals:    05/17/22 1452   Weight: 102.1 kg (225 lb)   Height: 1.6 m (5' 3\")   General  - normal appearance, in no obvious distress  Musculoskeletal - left wrist  - inspection: mild central dorsal wrist swelling  - palpation: no bony or soft tissue tenderness  - ROM: Full range of motion, painful resisted passive and active endrange flexion and extension  - strength: 5/5  strength  Neuro  - no sensory or motor deficit, grossly normal coordination, normal muscle tone            ASSESSMENT & PLAN  Ms. Valdes is a 21 year old female following up with left wrist pain that is now chronic.    We revisited possible etiologies for her pain, her pain may be secondary to a ganglion cyst or a separate intra-articular issue.  Given her level of pain without improvement with conservative care I am ordering an MRI.  She can have this done at her earliest convenience.    We should follow-up after MRI to go over the results.    It was a pleasure seeing Vita.    Guillermo Delgado DO, KLAUS      This note was constructed using Dragon dictation software, please excuse any minor errors in spelling, grammar, or syntax.        Again, thank you for allowing me to participate in the care of your " patient.      Sincerely,    Guillermo Delgado, DO

## 2022-05-26 ENCOUNTER — ANCILLARY PROCEDURE (OUTPATIENT)
Dept: MRI IMAGING | Facility: CLINIC | Age: 22
End: 2022-05-26
Attending: FAMILY MEDICINE
Payer: COMMERCIAL

## 2022-05-26 DIAGNOSIS — G89.29 CHRONIC PAIN OF LEFT WRIST: ICD-10-CM

## 2022-05-26 DIAGNOSIS — M25.532 CHRONIC PAIN OF LEFT WRIST: ICD-10-CM

## 2022-05-26 PROCEDURE — 73221 MRI JOINT UPR EXTREM W/O DYE: CPT | Mod: LT | Performed by: RADIOLOGY

## 2022-06-07 ENCOUNTER — OFFICE VISIT (OUTPATIENT)
Dept: ORTHOPEDICS | Facility: CLINIC | Age: 22
End: 2022-06-07
Payer: COMMERCIAL

## 2022-06-07 VITALS — WEIGHT: 225 LBS | HEIGHT: 63 IN | BODY MASS INDEX: 39.87 KG/M2

## 2022-06-07 DIAGNOSIS — G89.29 CHRONIC PAIN OF LEFT WRIST: Primary | ICD-10-CM

## 2022-06-07 DIAGNOSIS — M25.532 CHRONIC PAIN OF LEFT WRIST: Primary | ICD-10-CM

## 2022-06-07 PROCEDURE — 99213 OFFICE O/P EST LOW 20 MIN: CPT | Performed by: FAMILY MEDICINE

## 2022-06-07 NOTE — LETTER
Date:June 8, 2022      Patient was self referred, no letter generated. Do not send.        [facility] Health Information

## 2022-06-07 NOTE — PROGRESS NOTES
HISTORY OF PRESENT ILLNESS  Ms. Valdes is a pleasant 21 year old female following up with left wrist pain.  Vita is here to review her MRI.  Unfortunately her symptoms are not improved since last seeing me, she actually feels worse over the past couple of weeks.  She has been wearing her brace at almost all times and is continuing her hand therapy exercises.    PHYSICAL EXAM  General  - normal appearance, in no obvious distress  Musculoskeletal - left wrist  - inspection: trace wrist swelling  - palpation: tender central dorsal wrist  - ROM: FROM, clicking and pain with flexion, extension  - strength: 5/5  strength, painful when gripping  Neuro  - no sensory or motor deficit, grossly normal coordination, normal muscle tone              ASSESSMENT & PLAN  Ms. Valdes is a 21 year old female following up with left wrist pain.    I reviewed her MRI the room with her, this does reveal an os styloid EM with degenerative changes at the distal capitate and dorsal base of the third metacarpal.  She also has a leaky ganglion cyst for her to originate at the radiocarpal joint which measures up to 13 mm.    Vita and I had a good discussion centering around her symptoms.  Unfortunately her symptoms are worsening despite immobilization and therapy.  She is interested in discussing surgical options, should he be indicated.  I am referring her to our partners to have this discussion.    It was a pleasure seeing Vita.        Guillermo Delgado, DO, CAQSM      This note was constructed using Dragon dictation software, please excuse any minor errors in spelling, grammar, or syntax.

## 2022-06-07 NOTE — LETTER
6/7/2022      RE: Vita Valdes  [address]  [place] [address]     Dear Colleague,    Thank you for referring your patient, Vita Valdes, to the [facility] [place]. Please see a copy of my visit note below.    HISTORY OF PRESENT ILLNESS  Ms. Valdes is a pleasant 21 year old female following up with left wrist pain.  Vita is here to review her MRI.  Unfortunately her symptoms are not improved since last seeing me, she actually feels worse over the past couple of weeks.  She has been wearing her brace at almost all times and is continuing her hand therapy exercises.    PHYSICAL EXAM  General  - normal appearance, in no obvious distress  Musculoskeletal - left wrist  - inspection: trace wrist swelling  - palpation: tender central dorsal wrist  - ROM: FROM, clicking and pain with flexion, extension  - strength: 5/5  strength, painful when gripping  Neuro  - no sensory or motor deficit, grossly normal coordination, normal muscle tone              ASSESSMENT & PLAN  Ms. Valdes is a 21 year old female following up with left wrist pain.    I reviewed her MRI the room with her, this does reveal an os styloid EM with degenerative changes at the distal capitate and dorsal base of the third metacarpal.  She also has a leaky ganglion cyst for her to originate at the radiocarpal joint which measures up to 13 mm.    Vita and I had a good discussion centering around her symptoms.  Unfortunately her symptoms are worsening despite immobilization and therapy.  She is interested in discussing surgical options, should he be indicated.  I am referring her to our partners to have this discussion.    It was a pleasure seeing Vita.        Guillermo Delgado, DO, CAQSM      This note was constructed using Dragon dictation software, please excuse any minor errors in spelling, grammar, or syntax.        Again, thank you for allowing me to participate in the care of your patient.       Sincerely,    Guillermo Delgado, DO

## 2022-06-15 NOTE — TELEPHONE ENCOUNTER
Action Mari 15, 2022 10:43 AM MT   Action Taken Called patient for ARIAS, patient has not seen anyones else for this issue, so patient will fill out an ARIAS at [facility], if other records are needed in the future.          DIAGNOSIS: left wrist   APPOINTMENT DATE: 06/22/2022   NOTES STATUS DETAILS   OFFICE NOTE from referring provider Internal 06/07/2022, 05/17/2022, 04/07/2021 - Guillermo Delgado [facility]   OFFICE NOTE from other specialist Internal 03/30/2021 - Rosas Farooq [facility] Family Med   MEDICATION LIST Internal    LABS     CBC/DIFF Internal Most Recent: 02/01/2022   MRI PACS 05/26/2022 - LT Wrist   XRAYS (IMAGES & REPORTS) PACS 03/30/2021 - LT Wrist

## 2022-06-22 ENCOUNTER — PRE VISIT (OUTPATIENT)
Dept: ORTHOPEDICS | Facility: CLINIC | Age: 22
End: 2022-06-22
Payer: COMMERCIAL

## 2022-09-25 ENCOUNTER — HEALTH MAINTENANCE LETTER (OUTPATIENT)
Age: 22
End: 2022-09-25

## 2023-05-08 ENCOUNTER — HEALTH MAINTENANCE LETTER (OUTPATIENT)
Age: 23
End: 2023-05-08

## 2024-05-11 ENCOUNTER — HEALTH MAINTENANCE LETTER (OUTPATIENT)
Age: 24
End: 2024-05-11

## 2025-05-17 ENCOUNTER — HEALTH MAINTENANCE LETTER (OUTPATIENT)
Age: 25
End: 2025-05-17